# Patient Record
Sex: MALE | Race: WHITE | Employment: FULL TIME
[De-identification: names, ages, dates, MRNs, and addresses within clinical notes are randomized per-mention and may not be internally consistent; named-entity substitution may affect disease eponyms.]

---

## 2017-04-20 PROBLEM — N40.0 BENIGN NON-NODULAR PROSTATIC HYPERPLASIA WITHOUT LOWER URINARY TRACT SYMPTOMS: Status: ACTIVE | Noted: 2017-04-20

## 2019-10-02 PROBLEM — K57.90 DIVERTICULOSIS: Status: ACTIVE | Noted: 2019-10-02

## 2022-03-19 PROBLEM — K57.90 DIVERTICULOSIS: Status: ACTIVE | Noted: 2019-10-02

## 2022-03-19 PROBLEM — N40.0 BENIGN NON-NODULAR PROSTATIC HYPERPLASIA WITHOUT LOWER URINARY TRACT SYMPTOMS: Status: ACTIVE | Noted: 2017-04-20

## 2022-09-07 ENCOUNTER — OFFICE VISIT (OUTPATIENT)
Dept: FAMILY MEDICINE CLINIC | Facility: CLINIC | Age: 52
End: 2022-09-07
Payer: COMMERCIAL

## 2022-09-07 VITALS — WEIGHT: 150 LBS | SYSTOLIC BLOOD PRESSURE: 126 MMHG | DIASTOLIC BLOOD PRESSURE: 74 MMHG

## 2022-09-07 DIAGNOSIS — L03.115 CELLULITIS OF RIGHT LOWER EXTREMITY: Primary | ICD-10-CM

## 2022-09-07 PROCEDURE — 99213 OFFICE O/P EST LOW 20 MIN: CPT | Performed by: FAMILY MEDICINE

## 2022-09-07 RX ORDER — TAMSULOSIN HYDROCHLORIDE 0.4 MG/1
0.4 CAPSULE ORAL DAILY
COMMUNITY

## 2022-09-07 RX ORDER — CEPHALEXIN 250 MG/1
250 CAPSULE ORAL 4 TIMES DAILY
Qty: 20 CAPSULE | Refills: 0 | Status: SHIPPED | OUTPATIENT
Start: 2022-09-07 | End: 2022-10-12

## 2022-09-07 ASSESSMENT — PATIENT HEALTH QUESTIONNAIRE - PHQ9
SUM OF ALL RESPONSES TO PHQ QUESTIONS 1-9: 0
SUM OF ALL RESPONSES TO PHQ9 QUESTIONS 1 & 2: 0
1. LITTLE INTEREST OR PLEASURE IN DOING THINGS: 0
2. FEELING DOWN, DEPRESSED OR HOPELESS: 0
SUM OF ALL RESPONSES TO PHQ QUESTIONS 1-9: 0

## 2022-09-07 NOTE — PROGRESS NOTES
Subjective:   Scottie Mcdermott III is a 45 y.o. male presents today for area on right lower extremity he thinks might be infected. Recently had a tattoo on the right lower extremity, 5-hour work. The 1 area has been warm and red, none of the other areas are like this. PHQ-9 Total Score: 0 (9/7/2022  3:39 PM)      Allergies   Allergen Reactions    Latex     Penicillins Other (See Comments)     PMH, MEDS reviewed     Objective:   Blood pressure 126/74, weight 150 lb (68 kg). General- Pleasant and no distress  Psych- alert and oriented to person, place and time  Mood and affect are appropriate to situation  Musculoskeletal - Gait and station examination reveals mid-position with no abnormalities. Neurological- grossly intact. Entire right lower extremity has a large tattoo. There is an area near the right medial gastrocnemius with a 4 x 2 cm long oval-shaped area that is more of an eschar. Surrounding erythema and slightly tender. Looks infected  Assessment/Plan:     1. Cellulitis of right lower extremity         1. Cellulitis of right lower extremity  -     cephALEXin (KEFLEX) 250 MG capsule; Take 1 capsule by mouth 4 times daily, Disp-20 capsule, R-0Normal      Followup:   No follow-ups on file.

## 2022-10-12 ENCOUNTER — OFFICE VISIT (OUTPATIENT)
Dept: FAMILY MEDICINE CLINIC | Facility: CLINIC | Age: 52
End: 2022-10-12
Payer: COMMERCIAL

## 2022-10-12 VITALS
WEIGHT: 145.2 LBS | OXYGEN SATURATION: 97 % | BODY MASS INDEX: 25.73 KG/M2 | SYSTOLIC BLOOD PRESSURE: 116 MMHG | DIASTOLIC BLOOD PRESSURE: 68 MMHG | HEART RATE: 72 BPM | TEMPERATURE: 98 F | HEIGHT: 63 IN

## 2022-10-12 DIAGNOSIS — M25.561 ACUTE PAIN OF RIGHT KNEE: Primary | ICD-10-CM
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
## 2022-10-12 PROBLEM — R79.89 ELEVATED TSH: Status: ACTIVE | Noted: 2022-10-10

## 2022-10-12 PROBLEM — S63.282A: Status: ACTIVE | Noted: 2019-08-28

## 2022-10-12 PROBLEM — B07.8 OTHER VIRAL WARTS: Status: ACTIVE | Noted: 2021-10-06

## 2022-10-12 PROBLEM — M05.79 RHEUMATOID ARTHRITIS OF MULTIPLE SITES WITHOUT ORGAN OR SYSTEM INVOLVEMENT WITH POSITIVE RHEUMATOID FACTOR (HCC): Status: ACTIVE | Noted: 2017-10-19

## 2022-10-12 PROBLEM — K21.9 GASTROESOPHAGEAL REFLUX DISEASE WITHOUT ESOPHAGITIS: Status: ACTIVE | Noted: 2021-02-23

## 2022-10-12 PROBLEM — J45.909 INTRINSIC ASTHMA: Status: ACTIVE | Noted: 2021-02-23

## 2022-10-12 PROBLEM — R35.0 BENIGN PROSTATIC HYPERPLASIA WITH URINARY FREQUENCY: Status: ACTIVE | Noted: 2021-02-23

## 2022-10-12 PROBLEM — E80.4 GILBERT SYNDROME: Status: ACTIVE | Noted: 2017-10-19

## 2022-10-12 PROBLEM — E78.01 FAMILIAL HYPERCHOLESTEROLEMIA: Status: ACTIVE | Noted: 2022-10-10

## 2022-10-12 PROBLEM — N40.1 BENIGN PROSTATIC HYPERPLASIA WITH URINARY FREQUENCY: Status: ACTIVE | Noted: 2021-02-23

## 2022-10-12 PROBLEM — K64.8 OTHER HEMORRHOIDS: Status: ACTIVE | Noted: 2022-10-11

## 2022-10-12 PROBLEM — M77.02 MEDIAL EPICONDYLITIS OF ELBOW, LEFT: Status: ACTIVE | Noted: 2022-06-08

## 2022-10-12 PROBLEM — E55.9 VITAMIN D DEFICIENCY: Status: ACTIVE | Noted: 2022-10-12

## 2022-10-12 PROBLEM — R05.9 COUGH: Status: ACTIVE | Noted: 2018-05-10

## 2022-10-12 PROCEDURE — 99213 OFFICE O/P EST LOW 20 MIN: CPT | Performed by: FAMILY MEDICINE

## 2022-10-12 ASSESSMENT — PATIENT HEALTH QUESTIONNAIRE - PHQ9
SUM OF ALL RESPONSES TO PHQ QUESTIONS 1-9: 0
SUM OF ALL RESPONSES TO PHQ9 QUESTIONS 1 & 2: 0
1. LITTLE INTEREST OR PLEASURE IN DOING THINGS: 0
SUM OF ALL RESPONSES TO PHQ QUESTIONS 1-9: 0
2. FEELING DOWN, DEPRESSED OR HOPELESS: 0
SUM OF ALL RESPONSES TO PHQ QUESTIONS 1-9: 0
SUM OF ALL RESPONSES TO PHQ QUESTIONS 1-9: 0
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
## 2022-10-26 ENCOUNTER — TELEPHONE (OUTPATIENT)
Dept: FAMILY MEDICINE CLINIC | Facility: CLINIC | Age: 52
End: 2022-10-26

## 2022-10-26 NOTE — TELEPHONE ENCOUNTER
Left message with [facility] to see if they can make the Henry's hemorrhoid cream. Patient notified we will let him know if they can fill it once we hear back from them.

## 2022-10-26 NOTE — TELEPHONE ENCOUNTER
Patient called. He states that a while ago, you gave him hemmorrhoid ointment, Henry 361 Formula. He said that the pharmacy that you sent it to last time (I think [address] [place] [address], doesn't have it any longer). He also wanted to let you know, supppositories don't work as well.

## 2022-10-31 NOTE — TELEPHONE ENCOUNTER
Rx called in to [facility] per Dr. Debora Caputo. Compounded [place] Formula:  Nidfedipine 0.2% mixed with Lidocaine 1% - apply 2-3 times daily - 60 g, 1 refill. Patient notified.

## 2022-11-11 PROBLEM — R05.9 COUGH: Status: RESOLVED | Noted: 2018-05-10 | Resolved: 2022-11-11
